# Patient Record
Sex: FEMALE | Race: BLACK OR AFRICAN AMERICAN | NOT HISPANIC OR LATINO | URBAN - METROPOLITAN AREA
[De-identification: names, ages, dates, MRNs, and addresses within clinical notes are randomized per-mention and may not be internally consistent; named-entity substitution may affect disease eponyms.]

---

## 2017-01-10 ENCOUNTER — APPOINTMENT (OUTPATIENT)
Dept: WOMENS IMAGING | Facility: HOSPITAL | Age: 67
End: 2017-01-10

## 2017-01-10 PROCEDURE — 76641 ULTRASOUND BREAST COMPLETE: CPT | Performed by: RADIOLOGY

## 2017-01-18 ENCOUNTER — OFFICE (AMBULATORY)
Dept: URBAN - METROPOLITAN AREA CLINIC 75 | Facility: CLINIC | Age: 67
End: 2017-01-18

## 2017-01-18 VITALS
HEART RATE: 70 BPM | SYSTOLIC BLOOD PRESSURE: 122 MMHG | DIASTOLIC BLOOD PRESSURE: 82 MMHG | HEIGHT: 61 IN | WEIGHT: 136 LBS

## 2017-01-18 DIAGNOSIS — K59.00 CONSTIPATION, UNSPECIFIED: ICD-10-CM

## 2017-01-18 DIAGNOSIS — K21.9 GASTRO-ESOPHAGEAL REFLUX DISEASE WITHOUT ESOPHAGITIS: ICD-10-CM

## 2017-01-18 DIAGNOSIS — R14.0 ABDOMINAL DISTENSION (GASEOUS): ICD-10-CM

## 2017-01-18 DIAGNOSIS — R19.4 CHANGE IN BOWEL HABIT: ICD-10-CM

## 2017-01-18 DIAGNOSIS — R10.84 GENERALIZED ABDOMINAL PAIN: ICD-10-CM

## 2017-01-18 PROCEDURE — 99214 OFFICE O/P EST MOD 30 MIN: CPT

## 2017-02-13 VITALS
DIASTOLIC BLOOD PRESSURE: 88 MMHG | RESPIRATION RATE: 18 BRPM | SYSTOLIC BLOOD PRESSURE: 187 MMHG | RESPIRATION RATE: 14 BRPM | HEART RATE: 80 BPM | HEART RATE: 78 BPM | SYSTOLIC BLOOD PRESSURE: 167 MMHG | SYSTOLIC BLOOD PRESSURE: 103 MMHG | OXYGEN SATURATION: 99 % | RESPIRATION RATE: 15 BRPM | DIASTOLIC BLOOD PRESSURE: 77 MMHG | RESPIRATION RATE: 16 BRPM | DIASTOLIC BLOOD PRESSURE: 75 MMHG | SYSTOLIC BLOOD PRESSURE: 124 MMHG | OXYGEN SATURATION: 100 % | HEART RATE: 104 BPM | RESPIRATION RATE: 17 BRPM | DIASTOLIC BLOOD PRESSURE: 72 MMHG | HEART RATE: 82 BPM | DIASTOLIC BLOOD PRESSURE: 93 MMHG | SYSTOLIC BLOOD PRESSURE: 112 MMHG | HEART RATE: 68 BPM | DIASTOLIC BLOOD PRESSURE: 68 MMHG | TEMPERATURE: 97.7 F | HEART RATE: 103 BPM | WEIGHT: 132 LBS | SYSTOLIC BLOOD PRESSURE: 121 MMHG | DIASTOLIC BLOOD PRESSURE: 63 MMHG | SYSTOLIC BLOOD PRESSURE: 109 MMHG | HEART RATE: 91 BPM | SYSTOLIC BLOOD PRESSURE: 100 MMHG | DIASTOLIC BLOOD PRESSURE: 58 MMHG | SYSTOLIC BLOOD PRESSURE: 111 MMHG | SYSTOLIC BLOOD PRESSURE: 107 MMHG | HEART RATE: 84 BPM | RESPIRATION RATE: 22 BRPM | HEART RATE: 90 BPM | HEIGHT: 61 IN | DIASTOLIC BLOOD PRESSURE: 64 MMHG | DIASTOLIC BLOOD PRESSURE: 71 MMHG | TEMPERATURE: 97.9 F | OXYGEN SATURATION: 98 %

## 2017-02-15 ENCOUNTER — AMBULATORY SURGICAL CENTER (AMBULATORY)
Dept: URBAN - METROPOLITAN AREA SURGERY 17 | Facility: SURGERY | Age: 67
End: 2017-02-15

## 2017-02-15 DIAGNOSIS — R19.4 CHANGE IN BOWEL HABIT: ICD-10-CM

## 2017-02-15 DIAGNOSIS — R10.84 GENERALIZED ABDOMINAL PAIN: ICD-10-CM

## 2017-02-15 DIAGNOSIS — K59.00 CONSTIPATION, UNSPECIFIED: ICD-10-CM

## 2017-02-15 DIAGNOSIS — R14.0 ABDOMINAL DISTENSION (GASEOUS): ICD-10-CM

## 2017-02-15 DIAGNOSIS — K57.30 DIVERTICULOSIS OF LARGE INTESTINE WITHOUT PERFORATION OR ABS: ICD-10-CM

## 2017-02-15 PROCEDURE — 45378 DIAGNOSTIC COLONOSCOPY: CPT | Performed by: INTERNAL MEDICINE

## 2017-02-15 RX ORDER — LUBIPROSTONE 8 UG/1
CAPSULE, GELATIN COATED ORAL
Qty: 180 | Refills: 1 | Status: COMPLETED
End: 2018-05-08

## 2017-02-15 RX ADMIN — PROPOFOL 50 MG: 10 INJECTION, EMULSION INTRAVENOUS at 13:34

## 2017-02-15 RX ADMIN — PROPOFOL 50 MG: 10 INJECTION, EMULSION INTRAVENOUS at 13:26

## 2017-02-15 RX ADMIN — PROPOFOL 50 MG: 10 INJECTION, EMULSION INTRAVENOUS at 13:20

## 2017-02-15 RX ADMIN — PROPOFOL 150 MG: 10 INJECTION, EMULSION INTRAVENOUS at 13:16

## 2017-03-07 ENCOUNTER — OFFICE VISIT (OUTPATIENT)
Dept: OBSTETRICS AND GYNECOLOGY | Age: 67
End: 2017-03-07

## 2017-03-07 VITALS
BODY MASS INDEX: 25.41 KG/M2 | WEIGHT: 134.6 LBS | DIASTOLIC BLOOD PRESSURE: 68 MMHG | SYSTOLIC BLOOD PRESSURE: 124 MMHG | HEIGHT: 61 IN

## 2017-03-07 DIAGNOSIS — Z00.00 ANNUAL PHYSICAL EXAM: Primary | ICD-10-CM

## 2017-03-07 DIAGNOSIS — M81.0 OSTEOPOROSIS: ICD-10-CM

## 2017-03-07 DIAGNOSIS — I10 ESSENTIAL HYPERTENSION: ICD-10-CM

## 2017-03-07 PROCEDURE — G0101 CA SCREEN;PELVIC/BREAST EXAM: HCPCS | Performed by: OBSTETRICS & GYNECOLOGY

## 2017-03-07 RX ORDER — ACYCLOVIR 50 MG/G
OINTMENT TOPICAL EVERY 4 HOURS
Qty: 15 G | Refills: 1 | Status: SHIPPED | OUTPATIENT
Start: 2017-03-07 | End: 2017-03-14

## 2017-03-07 RX ORDER — AMLODIPINE BESYLATE 2.5 MG/1
2.5 TABLET ORAL
COMMUNITY
Start: 2017-01-19 | End: 2021-03-29 | Stop reason: SDUPTHER

## 2017-03-07 RX ORDER — ACYCLOVIR 400 MG/1
400 TABLET ORAL DAILY
Qty: 90 TABLET | Refills: 1 | Status: SHIPPED | OUTPATIENT
Start: 2017-03-07 | End: 2020-03-16 | Stop reason: SDUPTHER

## 2017-03-07 NOTE — PROGRESS NOTES
Jenna Anuglo is a 67 y.o. female is being seen today for Annual Exam  Chief Complaint   Patient presents with   • Gynecologic Exam   .    History of Present Illness  Patient presents for annual check.  Overall doing very well exercises well is trying to eat better.  Her bowels avoids Benadryl off-and-on but again.  Attention to what she is eating how she is eating when she is eating things are mostly under control.  We talked at smoothies cleansing probiotics she does take activity every day.  No new family history.  One of her good friends is having a heart bypass today.  She does occasionally get the herpes outbreaks and again we talked about what to do for that and she would like prescriptions called in.  No other complaints  The following portions of the patient's history were reviewed and updated as appropriate: allergies, current medications, past family history, past medical history, past social history, past surgical history and problem list.    PAST MEDICAL HISTORY  History reviewed. No pertinent past medical history.  OB History     No data available        History reviewed. No pertinent past surgical history.  History reviewed. No pertinent family history.  History   Smoking Status   • Former Smoker   Smokeless Tobacco   • Not on file       Current Outpatient Prescriptions:   •  amLODIPine (NORVASC) 2.5 MG tablet, Take 2.5 mg by mouth., Disp: , Rfl:   •  acyclovir (ZOVIRAX) 400 MG tablet, Take 1 tablet by mouth Daily. Take no more than 5 doses a day., Disp: 90 tablet, Rfl: 1  •  acyclovir (ZOVIRAX) 5 % ointment, Apply  topically Every 4 (Four) Hours for 7 days. Apply thin layer to affected area, Disp: 15 g, Rfl: 1    There is no immunization history on file for this patient.    Review of Systems   Constitutional: Negative for chills, fatigue, fever and unexpected weight change.   Respiratory: Negative for shortness of breath and wheezing.    Cardiovascular: Negative for chest pain.    Gastrointestinal: Negative for abdominal distention, abdominal pain, blood in stool, constipation, diarrhea and nausea.   Genitourinary: Negative for difficulty urinating, dyspareunia, dysuria, frequency, hematuria, menstrual problem, pelvic pain, urgency and vaginal discharge.   Skin: Negative for rash.       Objective   Physical Exam   Constitutional: She is oriented to person, place, and time. Vital signs are normal. She appears well-developed and well-nourished.   Neck: No thyromegaly present.   Cardiovascular: Normal rate, regular rhythm and normal heart sounds.    Pulmonary/Chest: Effort normal. Right breast exhibits no inverted nipple, no mass, no nipple discharge, no skin change and no tenderness. Left breast exhibits no inverted nipple, no mass, no nipple discharge, no skin change and no tenderness. Breasts are symmetrical. There is no breast swelling.   Abdominal: Soft.   Genitourinary: Vagina normal and uterus normal. No breast tenderness, discharge or bleeding. Pelvic exam was performed with patient supine. No labial fusion. There is no rash, tenderness, lesion or injury on the right labia. There is no rash, tenderness, lesion or injury on the left labia. Cervix exhibits no motion tenderness, no discharge and no friability. Right adnexum displays no mass, no tenderness and no fullness. Left adnexum displays no mass, no tenderness and no fullness.   Neurological: She is alert and oriented to person, place, and time.   Psychiatric: She has a normal mood and affect.   Vitals reviewed.        Assessment/Plan   Lenore was seen today for gynecologic exam.    Diagnoses and all orders for this visit:    Annual physical exam    Essential hypertension    Osteoporosis    Other orders  -     acyclovir (ZOVIRAX) 400 MG tablet; Take 1 tablet by mouth Daily. Take no more than 5 doses a day.  -     acyclovir (ZOVIRAX) 5 % ointment; Apply  topically Every 4 (Four) Hours for 7 days. Apply thin layer to affected  area    Normal exam today.  Pap smear not done today.  She has appointment for mammogram this month.  Colonoscopy couple weeks ago was negative.  And we'll get a bone density next year.  Come back in a year

## 2017-04-24 ENCOUNTER — OFFICE (AMBULATORY)
Dept: URBAN - METROPOLITAN AREA CLINIC 75 | Facility: CLINIC | Age: 67
End: 2017-04-24

## 2017-04-24 VITALS
SYSTOLIC BLOOD PRESSURE: 138 MMHG | HEART RATE: 66 BPM | DIASTOLIC BLOOD PRESSURE: 80 MMHG | WEIGHT: 133 LBS | HEIGHT: 61 IN

## 2017-04-24 DIAGNOSIS — K57.30 DIVERTICULOSIS OF LARGE INTESTINE WITHOUT PERFORATION OR ABS: ICD-10-CM

## 2017-04-24 DIAGNOSIS — R14.0 ABDOMINAL DISTENSION (GASEOUS): ICD-10-CM

## 2017-04-24 DIAGNOSIS — K59.00 CONSTIPATION, UNSPECIFIED: ICD-10-CM

## 2017-04-24 DIAGNOSIS — K21.9 GASTRO-ESOPHAGEAL REFLUX DISEASE WITHOUT ESOPHAGITIS: ICD-10-CM

## 2017-04-24 PROCEDURE — 99214 OFFICE O/P EST MOD 30 MIN: CPT

## 2017-07-26 ENCOUNTER — APPOINTMENT (OUTPATIENT)
Dept: WOMENS IMAGING | Facility: HOSPITAL | Age: 67
End: 2017-07-26

## 2017-07-26 PROCEDURE — 77063 BREAST TOMOSYNTHESIS BI: CPT | Performed by: RADIOLOGY

## 2017-07-26 PROCEDURE — 76641 ULTRASOUND BREAST COMPLETE: CPT | Performed by: RADIOLOGY

## 2017-07-26 PROCEDURE — G0202 SCR MAMMO BI INCL CAD: HCPCS | Performed by: RADIOLOGY

## 2017-09-05 ENCOUNTER — OFFICE (AMBULATORY)
Dept: URBAN - METROPOLITAN AREA CLINIC 75 | Facility: CLINIC | Age: 67
End: 2017-09-05

## 2017-09-05 VITALS
HEIGHT: 61 IN | WEIGHT: 131 LBS | SYSTOLIC BLOOD PRESSURE: 112 MMHG | DIASTOLIC BLOOD PRESSURE: 62 MMHG | HEART RATE: 93 BPM

## 2017-09-05 DIAGNOSIS — R10.84 GENERALIZED ABDOMINAL PAIN: ICD-10-CM

## 2017-09-05 DIAGNOSIS — R19.4 CHANGE IN BOWEL HABIT: ICD-10-CM

## 2017-09-05 DIAGNOSIS — K59.00 CONSTIPATION, UNSPECIFIED: ICD-10-CM

## 2017-09-05 DIAGNOSIS — K57.30 DIVERTICULOSIS OF LARGE INTESTINE WITHOUT PERFORATION OR ABS: ICD-10-CM

## 2017-09-05 DIAGNOSIS — R14.0 ABDOMINAL DISTENSION (GASEOUS): ICD-10-CM

## 2017-09-05 DIAGNOSIS — K21.9 GASTRO-ESOPHAGEAL REFLUX DISEASE WITHOUT ESOPHAGITIS: ICD-10-CM

## 2017-09-05 PROCEDURE — 99214 OFFICE O/P EST MOD 30 MIN: CPT | Performed by: INTERNAL MEDICINE

## 2017-09-05 RX ORDER — LUBIPROSTONE 24 UG/1
CAPSULE, GELATIN COATED ORAL
Qty: 90 | Refills: 4 | Status: ACTIVE

## 2018-03-08 ENCOUNTER — OFFICE VISIT (OUTPATIENT)
Dept: OBSTETRICS AND GYNECOLOGY | Age: 68
End: 2018-03-08

## 2018-03-08 ENCOUNTER — PROCEDURE VISIT (OUTPATIENT)
Dept: OBSTETRICS AND GYNECOLOGY | Age: 68
End: 2018-03-08

## 2018-03-08 VITALS — BODY MASS INDEX: 24.93 KG/M2 | DIASTOLIC BLOOD PRESSURE: 78 MMHG | SYSTOLIC BLOOD PRESSURE: 136 MMHG | WEIGHT: 133 LBS

## 2018-03-08 DIAGNOSIS — K58.1 IRRITABLE BOWEL SYNDROME WITH CONSTIPATION: ICD-10-CM

## 2018-03-08 DIAGNOSIS — Z12.39 SCREENING BREAST EXAMINATION: Primary | ICD-10-CM

## 2018-03-08 DIAGNOSIS — N32.81 OAB (OVERACTIVE BLADDER): ICD-10-CM

## 2018-03-08 DIAGNOSIS — Z00.00 ANNUAL PHYSICAL EXAM: ICD-10-CM

## 2018-03-08 DIAGNOSIS — Z78.0 POST-MENOPAUSAL: Primary | ICD-10-CM

## 2018-03-08 PROCEDURE — 99397 PER PM REEVAL EST PAT 65+ YR: CPT | Performed by: OBSTETRICS & GYNECOLOGY

## 2018-03-08 PROCEDURE — 77080 DXA BONE DENSITY AXIAL: CPT | Performed by: OBSTETRICS & GYNECOLOGY

## 2018-03-08 RX ORDER — LUBIPROSTONE 24 UG/1
24 CAPSULE ORAL 2 TIMES DAILY WITH MEALS
COMMUNITY

## 2018-03-08 RX ORDER — ACYCLOVIR 50 MG/G
OINTMENT TOPICAL
Qty: 15 G | Refills: 1 | Status: SHIPPED | OUTPATIENT
Start: 2018-03-08 | End: 2019-03-08

## 2018-03-08 RX ORDER — POLYETHYLENE GLYCOL 3350 17 G/17G
17 POWDER, FOR SOLUTION ORAL DAILY
COMMUNITY

## 2018-03-08 NOTE — PROGRESS NOTES
Subjective   Lenore Angulo is a 68 y.o. female is being seen today for   Chief Complaint   Patient presents with   • Annual Exam     urine urgency   .    History of Present Illness  Patient is here for an annual check.  Overall she doing well with nothing new in the family.  But she does have some bowel and bladder problems.  She has OAB very minimal PIYUSH is all discussed with the patient.  We also discussed emptying and double voiding.  She's had chronic IBS of the slow type so she's been working on that with different things and is mostly under control beside every so often has low problem getting her stool come out.  I talked about possible cystocele rectocele.  Otherwise her family is doing well still gets outbreaks occasionally had possibly the flu last month but overall is doing well and she is good with exercise calcium etc.  The following portions of the patient's history were reviewed and updated as appropriate: allergies, current medications, past family history, past medical history, past social history, past surgical history and problem list.    Vitals:    03/08/18 0945   BP: 136/78       PAST MEDICAL HISTORY  No past medical history on file.  OB History     No data available        No past surgical history on file.  No family history on file.  History   Smoking Status   • Former Smoker   Smokeless Tobacco   • Not on file       Current Outpatient Prescriptions:   •  acyclovir (ZOVIRAX) 400 MG tablet, Take 1 tablet by mouth Daily. Take no more than 5 doses a day., Disp: 90 tablet, Rfl: 1  •  amLODIPine (NORVASC) 2.5 MG tablet, Take 2.5 mg by mouth., Disp: , Rfl:   •  lubiprostone (AMITIZA) 24 MCG capsule, Take 24 mcg by mouth 2 (Two) Times a Day With Meals., Disp: , Rfl:   •  polyethylene glycol (MIRALAX) packet, Take 17 g by mouth Daily., Disp: , Rfl:   •  acyclovir (ZOVIRAX) 5 % ointment, Apply thin layer to affected area, Disp: 15 g, Rfl: 1    There is no immunization history on file for this  patient.    Review of Systems   Constitutional: Negative for chills, fatigue, fever and unexpected weight change.   Respiratory: Negative for shortness of breath and wheezing.    Cardiovascular: Negative for chest pain.   Gastrointestinal: Negative for abdominal distention, abdominal pain, blood in stool, constipation, diarrhea and nausea.   Genitourinary: Positive for frequency and urgency. Negative for difficulty urinating, dyspareunia, dysuria, hematuria, menstrual problem, pelvic pain and vaginal discharge.   Skin: Negative for rash.       Objective   Physical Exam   Constitutional: She is oriented to person, place, and time. Vital signs are normal. She appears well-developed and well-nourished.   Neck: No thyromegaly present.   Cardiovascular: Normal rate, regular rhythm and normal heart sounds.    Pulmonary/Chest: Effort normal. Right breast exhibits no inverted nipple, no mass, no nipple discharge, no skin change and no tenderness. Left breast exhibits no inverted nipple, no mass, no nipple discharge, no skin change and no tenderness. Breasts are symmetrical. There is no breast swelling.   Abdominal: Soft.   Genitourinary: Vagina normal and uterus normal. No breast tenderness, discharge or bleeding. Pelvic exam was performed with patient supine. No labial fusion. There is no rash, tenderness, lesion or injury on the right labia. There is no rash, tenderness, lesion or injury on the left labia. Cervix exhibits no motion tenderness, no discharge and no friability. Right adnexum displays no mass, no tenderness and no fullness. Left adnexum displays no mass, no tenderness and no fullness.   Neurological: She is alert and oriented to person, place, and time.   Psychiatric: She has a normal mood and affect.   Vitals reviewed.        Assessment/Plan   Lenore was seen today for annual exam.    Diagnoses and all orders for this visit:    Screening breast examination  -     Mammo Screening Digital Tomosynthesis  Bilateral With CAD; Future    Annual physical exam    OAB (overactive bladder)    Irritable bowel syndrome with constipation    Other orders  -     acyclovir (ZOVIRAX) 5 % ointment; Apply thin layer to affected area    On exam is actually very normal with no significant cystocele or rectocele.  Pap smear will be due next year.  Order for mammogram placed.  Colonoscopy up to date.  Bone density was repeated today after 2 years is essentially the same she is right on the line of osteoporosis.  Diet and excise were discussed come back in a year

## 2018-05-08 ENCOUNTER — OFFICE (AMBULATORY)
Dept: URBAN - METROPOLITAN AREA CLINIC 75 | Facility: CLINIC | Age: 68
End: 2018-05-08

## 2018-05-08 VITALS
SYSTOLIC BLOOD PRESSURE: 120 MMHG | HEIGHT: 61 IN | WEIGHT: 132 LBS | DIASTOLIC BLOOD PRESSURE: 74 MMHG | HEART RATE: 64 BPM

## 2018-05-08 DIAGNOSIS — K21.9 GASTRO-ESOPHAGEAL REFLUX DISEASE WITHOUT ESOPHAGITIS: ICD-10-CM

## 2018-05-08 DIAGNOSIS — K59.00 CONSTIPATION, UNSPECIFIED: ICD-10-CM

## 2018-05-08 PROCEDURE — 99213 OFFICE O/P EST LOW 20 MIN: CPT

## 2018-07-30 ENCOUNTER — APPOINTMENT (OUTPATIENT)
Dept: WOMENS IMAGING | Facility: HOSPITAL | Age: 68
End: 2018-07-30

## 2018-07-30 PROCEDURE — 77067 SCR MAMMO BI INCL CAD: CPT | Performed by: RADIOLOGY

## 2018-11-26 ENCOUNTER — OFFICE VISIT (OUTPATIENT)
Dept: OBSTETRICS AND GYNECOLOGY | Age: 68
End: 2018-11-26

## 2018-11-26 ENCOUNTER — PROCEDURE VISIT (OUTPATIENT)
Dept: OBSTETRICS AND GYNECOLOGY | Age: 68
End: 2018-11-26

## 2018-11-26 VITALS
BODY MASS INDEX: 22.88 KG/M2 | DIASTOLIC BLOOD PRESSURE: 82 MMHG | WEIGHT: 134 LBS | SYSTOLIC BLOOD PRESSURE: 142 MMHG | HEIGHT: 64 IN

## 2018-11-26 DIAGNOSIS — R35.0 URINE FREQUENCY: Primary | ICD-10-CM

## 2018-11-26 DIAGNOSIS — N95.0 PMB (POSTMENOPAUSAL BLEEDING): Primary | ICD-10-CM

## 2018-11-26 DIAGNOSIS — N95.0 POSTMENOPAUSAL BLEEDING: ICD-10-CM

## 2018-11-26 DIAGNOSIS — N32.81 OAB (OVERACTIVE BLADDER): ICD-10-CM

## 2018-11-26 PROBLEM — E21.3 HYPERPARATHYROIDISM: Status: ACTIVE | Noted: 2017-12-15

## 2018-11-26 PROBLEM — K63.5 COLON POLYP: Status: ACTIVE | Noted: 2018-11-26

## 2018-11-26 PROBLEM — E55.9 VITAMIN D DEFICIENCY: Status: ACTIVE | Noted: 2017-12-15

## 2018-11-26 LAB
BILIRUB BLD-MCNC: NEGATIVE MG/DL
CLARITY, POC: CLEAR
COLOR UR: ABNORMAL
GLUCOSE UR STRIP-MCNC: NEGATIVE MG/DL
KETONES UR QL: NEGATIVE
LEUKOCYTE EST, POC: NEGATIVE
NITRITE UR-MCNC: NEGATIVE MG/ML
PH UR: 6 [PH] (ref 5–8)
PROT UR STRIP-MCNC: NEGATIVE MG/DL
RBC # UR STRIP: ABNORMAL /UL
SP GR UR: 1.03 (ref 1–1.03)
UROBILINOGEN UR QL: NORMAL

## 2018-11-26 PROCEDURE — 99213 OFFICE O/P EST LOW 20 MIN: CPT | Performed by: OBSTETRICS & GYNECOLOGY

## 2018-11-26 PROCEDURE — 76830 TRANSVAGINAL US NON-OB: CPT | Performed by: OBSTETRICS & GYNECOLOGY

## 2018-11-26 PROCEDURE — 81002 URINALYSIS NONAUTO W/O SCOPE: CPT | Performed by: OBSTETRICS & GYNECOLOGY

## 2018-11-26 NOTE — PROGRESS NOTES
Subjective   Lenore Angulo is a 68 y.o. female is being seen today for   Chief Complaint   Patient presents with   • Follow-up     Lenore is here for urine frequency, uregency, and dark spotting.    • Urinary Frequency   .    History of Present Illness  Patient is here for a problem check.  The other day she was wiping after urinating and nose a little bit of blood.  No associated symptoms no cramps no symptoms of a UTI.  She does have frequency and urgency which is long-standing problem.  She presents for evaluation  The following portions of the patient's history were reviewed and updated as appropriate: allergies, current medications, past family history, past medical history, past social history, past surgical history and problem list.    Vitals:    11/26/18 1009   BP: 142/82         PAST MEDICAL HISTORY  History reviewed. No pertinent past medical history.  OB History     No data available        History reviewed. No pertinent surgical history.  History reviewed. No pertinent family history.  Social History     Tobacco Use   Smoking Status Former Smoker       Current Outpatient Medications:   •  acyclovir (ZOVIRAX) 400 MG tablet, Take 1 tablet by mouth Daily. Take no more than 5 doses a day., Disp: 90 tablet, Rfl: 1  •  amLODIPine (NORVASC) 2.5 MG tablet, Take 2.5 mg by mouth., Disp: , Rfl:   •  lubiprostone (AMITIZA) 24 MCG capsule, Take 24 mcg by mouth 2 (Two) Times a Day With Meals., Disp: , Rfl:   •  polyethylene glycol (MIRALAX) packet, Take 17 g by mouth Daily., Disp: , Rfl:   •  acyclovir (ZOVIRAX) 5 % ointment, Apply thin layer to affected area, Disp: 15 g, Rfl: 1  Immunization History   Administered Date(s) Administered   • Flu Vaccine High Dose PF 65YR+ 10/17/2018   • Flu Vaccine Quad PF >36MO 10/27/2014, 10/02/2017   • Influenza TIV (IM) 10/15/2012, 10/28/2013   • Pneumococcal Conjugate 13-Valent (PCV13) 10/22/2015   • Pneumococcal Polysaccharide (PPSV23) 10/27/2014   • Zostavax 06/26/2013        Review of Systems  Frequency, urgency  Objective   Physical Exam    Exam today was normal and did not see any blood present.  Assessment/Plan   Lenore was seen today for follow-up and urinary frequency.    Diagnoses and all orders for this visit:    Urine frequency  -     POC Urinalysis Dipstick    Postmenopausal bleeding    OAB (overactive bladder)      We did obtain an ultrasound.  Transvaginal ultrasound was performed I was in the room the whole time discussed the results with the patient.  The lining was under 4 mm multiple fibroids 1.  Much up against the canal.  Ovaries looked normal.    Her IBS is actually more under control with Robyn Tis is doing quite well with that.  So I discussed everything today I will have her leave a urine for UA which showed small blood in vault we'll culture that.  I don't believe it's from the uterus.  Results to be communicated  I also gave her some samples of myrbetriq.

## 2018-11-28 LAB
BACTERIA UR CULT: NORMAL
BACTERIA UR CULT: NORMAL

## 2018-12-04 ENCOUNTER — TELEPHONE (OUTPATIENT)
Dept: OBSTETRICS AND GYNECOLOGY | Age: 68
End: 2018-12-04

## 2018-12-04 NOTE — TELEPHONE ENCOUNTER
Lenore was notified no UTI.  She wanted to know what the next step is in regards to her PMB.  She is aware you are out until tomorrow.

## 2018-12-10 ENCOUNTER — TELEPHONE (OUTPATIENT)
Dept: OBSTETRICS AND GYNECOLOGY | Age: 68
End: 2018-12-10

## 2018-12-12 ENCOUNTER — OFFICE VISIT (OUTPATIENT)
Dept: OBSTETRICS AND GYNECOLOGY | Age: 68
End: 2018-12-12

## 2018-12-12 VITALS
DIASTOLIC BLOOD PRESSURE: 74 MMHG | HEIGHT: 62 IN | BODY MASS INDEX: 24.66 KG/M2 | SYSTOLIC BLOOD PRESSURE: 122 MMHG | WEIGHT: 134 LBS

## 2018-12-12 DIAGNOSIS — N95.0 POST-MENOPAUSAL BLEEDING: Primary | ICD-10-CM

## 2018-12-12 PROCEDURE — 58100 BIOPSY OF UTERUS LINING: CPT | Performed by: PHYSICIAN ASSISTANT

## 2018-12-12 RX ORDER — CALCIUM CARBONATE 200(500)MG
2 TABLET,CHEWABLE ORAL DAILY
COMMUNITY

## 2018-12-12 RX ORDER — CHOLECALCIFEROL (VITAMIN D3) 1250 MCG
CAPSULE ORAL
COMMUNITY
Start: 2018-12-08 | End: 2020-07-17 | Stop reason: SDUPTHER

## 2018-12-12 NOTE — PROGRESS NOTES
Endometrial Biopsy    Date of procedure:  12/12/2018    Procedure documentation:    The cervix was grasped anterior at the 12 o'clock position.  The cavity sounded to 6 centimeters.  An endometrial biopsy specimen was obtained and multiple passes.  The tissue was sent for permanent histopathologic evaluation.  Loretta was removed from the cervix with scant bleeding.    Post procedure instructions: She was instructed to call us in 1 week's time if she has not heard from us otherwise.  If there is any significant fever or excessive bleeding or pain she is to call immediately.      Pt tolerated procedure well  Will call with final results

## 2018-12-15 LAB
DX ICD CODE: NORMAL
DX ICD CODE: NORMAL
PATH REPORT.FINAL DX SPEC: NORMAL
PATH REPORT.GROSS SPEC: NORMAL
PATH REPORT.RELEVANT HX SPEC: NORMAL
PATH REPORT.SITE OF ORIGIN SPEC: NORMAL
PATHOLOGIST NAME: NORMAL
PAYMENT PROCEDURE: NORMAL

## 2019-03-13 ENCOUNTER — OFFICE VISIT (OUTPATIENT)
Dept: OBSTETRICS AND GYNECOLOGY | Age: 69
End: 2019-03-13

## 2019-03-13 VITALS
WEIGHT: 132 LBS | BODY MASS INDEX: 24.29 KG/M2 | HEIGHT: 62 IN | DIASTOLIC BLOOD PRESSURE: 74 MMHG | SYSTOLIC BLOOD PRESSURE: 140 MMHG

## 2019-03-13 DIAGNOSIS — K21.9 GASTROESOPHAGEAL REFLUX DISEASE, ESOPHAGITIS PRESENCE NOT SPECIFIED: Primary | ICD-10-CM

## 2019-03-13 DIAGNOSIS — Z12.4 PAP SMEAR FOR CERVICAL CANCER SCREENING: ICD-10-CM

## 2019-03-13 DIAGNOSIS — Z11.51 SCREENING FOR HUMAN PAPILLOMAVIRUS: ICD-10-CM

## 2019-03-13 DIAGNOSIS — Z00.00 ANNUAL PHYSICAL EXAM: ICD-10-CM

## 2019-03-13 DIAGNOSIS — D21.9 FIBROIDS: ICD-10-CM

## 2019-03-13 PROCEDURE — 99397 PER PM REEVAL EST PAT 65+ YR: CPT | Performed by: OBSTETRICS & GYNECOLOGY

## 2019-03-13 RX ORDER — OMEPRAZOLE 40 MG/1
CAPSULE, DELAYED RELEASE ORAL
COMMUNITY
Start: 2019-03-10

## 2019-03-13 NOTE — PROGRESS NOTES
Subjective   Lenore Angulo is a 69 y.o. female is being seen today for   Chief Complaint   Patient presents with   • Follow-up      PMB bleeding in Dec. 2018, bx = normal.  2016 pap = negative.  Dexa 03/08/2018  MG 07/30/2018.  PM no HRT. C-scope is due (hx of colon polyps). Family hx of breast cancer.  Lenore is BrCa negative.    .    History of Present Illness  Patient is here for her annual check.  She was in few months ago for some spotting had ultrasound biopsy everything was okay with that.  No further bleeding.  Had a trip recently through the [place] and is set to go to [place] for spring break.  She has a little problem with her left knee has been aching lately and swelling up some she is going to have that looked at.  Otherwise everything is been much the same.  Still has OAB and IBS.  Nothing new in the family no other complaints  The following portions of the patient's history were reviewed and updated as appropriate: allergies, current medications, past family history, past medical history, past social history, past surgical history and problem list.    Vitals:    03/13/19 0844   BP: 140/74       PAST MEDICAL HISTORY  Past Medical History:   Diagnosis Date   • BRCA negative    • Colon polyps      OB History     No data available        History reviewed. No pertinent surgical history.  Family History   Problem Relation Age of Onset   • Breast cancer Mother    • Breast cancer Sister    • No Known Problems Son    • No Known Problems Son      Social History     Tobacco Use   Smoking Status Never Smoker   Smokeless Tobacco Never Used       Current Outpatient Medications:   •  acyclovir (ZOVIRAX) 400 MG tablet, Take 1 tablet by mouth Daily. Take no more than 5 doses a day., Disp: 90 tablet, Rfl: 1  •  amLODIPine (NORVASC) 2.5 MG tablet, Take 2.5 mg by mouth., Disp: , Rfl:   •  Cholecalciferol (VITAMIN D3) 73526 units capsule, TAKE 1 TABLET BY MOUTH ONCE A WEEK, Disp: , Rfl:   •  lubiprostone (AMITIZA) 24  MCG capsule, Take 24 mcg by mouth 2 (Two) Times a Day With Meals., Disp: , Rfl:   •  omeprazole (priLOSEC) 40 MG capsule, , Disp: , Rfl:   •  polyethylene glycol (MIRALAX) packet, Take 17 g by mouth Daily., Disp: , Rfl:   •  calcium carbonate (TUMS) 500 MG chewable tablet, Chew 2 tablets Daily., Disp: , Rfl:   Immunization History   Administered Date(s) Administered   • Flu Vaccine High Dose PF 65YR+ 10/17/2018   • Flu Vaccine Quad PF >36MO 10/27/2014, 10/02/2017   • Influenza TIV (IM) 10/15/2012, 10/28/2013   • Pneumococcal Conjugate 13-Valent (PCV13) 10/22/2015   • Pneumococcal Polysaccharide (PPSV23) 10/27/2014   • Zostavax 06/26/2013       Review of Systems   Constitutional: Negative for chills, fatigue, fever and unexpected weight change.   Respiratory: Negative for shortness of breath and wheezing.    Cardiovascular: Negative for chest pain.   Gastrointestinal: Positive for constipation. Negative for abdominal distention, abdominal pain, blood in stool, diarrhea and nausea.   Genitourinary: Positive for frequency and urgency. Negative for difficulty urinating, dyspareunia, dysuria, hematuria, menstrual problem, pelvic pain and vaginal discharge.   Musculoskeletal: Positive for arthralgias.   Skin: Negative for rash.       Objective   Physical Exam   Constitutional: She is oriented to person, place, and time. Vital signs are normal. She appears well-developed and well-nourished.   Neck: No thyromegaly present.   Cardiovascular: Normal rate, regular rhythm and normal heart sounds.   Pulmonary/Chest: Effort normal. Right breast exhibits no inverted nipple, no mass, no nipple discharge, no skin change and no tenderness. Left breast exhibits no inverted nipple, no mass, no nipple discharge, no skin change and no tenderness. Breasts are symmetrical. There is no breast swelling.   Abdominal: Soft.   Genitourinary: Vagina normal and uterus normal. No breast tenderness, discharge or bleeding. Pelvic exam was performed  with patient supine. No labial fusion. There is no rash, tenderness, lesion or injury on the right labia. There is no rash, tenderness, lesion or injury on the left labia. Cervix exhibits no motion tenderness, no discharge and no friability. Right adnexum displays no mass, no tenderness and no fullness. Left adnexum displays no mass, no tenderness and no fullness.   Neurological: She is alert and oriented to person, place, and time.   Psychiatric: She has a normal mood and affect.   Vitals reviewed.        Assessment/Plan   Lenore was seen today for follow-up.    Diagnoses and all orders for this visit:    Gastroesophageal reflux disease, esophagitis presence not specified    Annual physical exam    Fibroids      Overall the exam is normal.  Years does have some fibroids.  Pap smear done today.  Mammogram bone colonoscopies all up-to-date she is about to have another colonoscopy.  She has had polyps in the past.  She is good with exercise.  She had recent bout of left-sided facial discomfort when she exercised she has TMJ and she is going to have that looked at.  Back in a year

## 2019-03-15 LAB
CONV .: NORMAL
CYTOLOGIST CVX/VAG CYTO: NORMAL
CYTOLOGY CVX/VAG DOC THIN PREP: NORMAL
DX ICD CODE: NORMAL
HIV 1 & 2 AB SER-IMP: NORMAL
OTHER STN SPEC: NORMAL
PATH REPORT.FINAL DX SPEC: NORMAL
STAT OF ADQ CVX/VAG CYTO-IMP: NORMAL

## 2019-05-28 ENCOUNTER — OFFICE (AMBULATORY)
Dept: URBAN - METROPOLITAN AREA CLINIC 75 | Facility: CLINIC | Age: 69
End: 2019-05-28

## 2019-05-28 VITALS
HEART RATE: 80 BPM | DIASTOLIC BLOOD PRESSURE: 70 MMHG | SYSTOLIC BLOOD PRESSURE: 130 MMHG | HEIGHT: 61 IN | WEIGHT: 133 LBS

## 2019-05-28 DIAGNOSIS — R10.84 GENERALIZED ABDOMINAL PAIN: ICD-10-CM

## 2019-05-28 DIAGNOSIS — R14.0 ABDOMINAL DISTENSION (GASEOUS): ICD-10-CM

## 2019-05-28 DIAGNOSIS — R19.4 CHANGE IN BOWEL HABIT: ICD-10-CM

## 2019-05-28 DIAGNOSIS — K59.00 CONSTIPATION, UNSPECIFIED: ICD-10-CM

## 2019-05-28 DIAGNOSIS — K57.30 DIVERTICULOSIS OF LARGE INTESTINE WITHOUT PERFORATION OR ABS: ICD-10-CM

## 2019-05-28 PROCEDURE — 99213 OFFICE O/P EST LOW 20 MIN: CPT | Performed by: INTERNAL MEDICINE

## 2019-05-28 RX ORDER — LUBIPROSTONE 24 UG/1
CAPSULE, GELATIN COATED ORAL
Qty: 90 | Refills: 4 | Status: ACTIVE

## 2019-05-28 RX ORDER — LUBIPROSTONE 24 UG/1
48 CAPSULE, GELATIN COATED ORAL
Qty: 180 | Refills: 3 | Status: COMPLETED
Start: 2019-05-28 | End: 2021-03-12

## 2019-07-31 ENCOUNTER — APPOINTMENT (OUTPATIENT)
Dept: WOMENS IMAGING | Facility: HOSPITAL | Age: 69
End: 2019-07-31

## 2019-07-31 PROCEDURE — 77067 SCR MAMMO BI INCL CAD: CPT | Performed by: RADIOLOGY

## 2019-07-31 PROCEDURE — 77063 BREAST TOMOSYNTHESIS BI: CPT | Performed by: RADIOLOGY

## 2020-03-16 ENCOUNTER — OFFICE VISIT (OUTPATIENT)
Dept: OBSTETRICS AND GYNECOLOGY | Age: 70
End: 2020-03-16

## 2020-03-16 VITALS
DIASTOLIC BLOOD PRESSURE: 70 MMHG | SYSTOLIC BLOOD PRESSURE: 122 MMHG | WEIGHT: 134 LBS | BODY MASS INDEX: 24.66 KG/M2 | HEIGHT: 62 IN

## 2020-03-16 DIAGNOSIS — N32.81 OAB (OVERACTIVE BLADDER): ICD-10-CM

## 2020-03-16 DIAGNOSIS — M81.0 AGE-RELATED OSTEOPOROSIS WITHOUT CURRENT PATHOLOGICAL FRACTURE: ICD-10-CM

## 2020-03-16 DIAGNOSIS — Z00.00 ANNUAL PHYSICAL EXAM: Primary | ICD-10-CM

## 2020-03-16 PROCEDURE — 99397 PER PM REEVAL EST PAT 65+ YR: CPT | Performed by: OBSTETRICS & GYNECOLOGY

## 2020-03-16 RX ORDER — ACYCLOVIR 400 MG/1
400 TABLET ORAL DAILY
Qty: 90 TABLET | Refills: 2 | Status: SHIPPED | OUTPATIENT
Start: 2020-03-16 | End: 2021-03-29 | Stop reason: SDUPTHER

## 2020-03-16 RX ORDER — FLUTICASONE PROPIONATE 50 MCG
SPRAY, SUSPENSION (ML) NASAL
COMMUNITY
Start: 2019-12-23

## 2020-03-16 RX ORDER — ACYCLOVIR 50 MG/G
OINTMENT TOPICAL
Qty: 15 G | Refills: 1 | Status: SHIPPED | OUTPATIENT
Start: 2020-03-16 | End: 2021-03-16

## 2020-03-16 NOTE — PROGRESS NOTES
Jenna Angulo is a 70 y.o. female is being seen today for   Chief Complaint   Patient presents with   • Gynecologic Exam     Pap 2019, MG 2019, DEXA 2018, unknown    .    History of Present Illness  Patient is here for an annual exam.  Overall she doing well had a great trip to [place] and [place].  Nothing new the family both kids live in town and grandkids.  Nothing new to report.  Bowels are the same but very much in control with Amitiza MiraLAX about once a month and a lot of water.  And her bladder is the same with urgency and urge incontinence but she is okay with that at this point.  I did offer referral to urology if it gets out of control.  She said if she couple more outbreaks this year of the herpes but she has been under little bit more stress lately.  I talked about taking them every day instead of just when she gets the outbreak give her some suppression for a while she would like the cream and the pill called in.  No other complaints  The following portions of the patient's history were reviewed and updated as appropriate: allergies, current medications, past family history, past medical history, past social history, past surgical history and problem list.    Vitals:    20 1023   BP: 122/70       PAST MEDICAL HISTORY  Past Medical History:   Diagnosis Date   • BRCA negative    • Colon polyps      OB History        2    Para   2    Term   2            AB        Living   2       SAB        TAB        Ectopic        Molar        Multiple        Live Births   2              History reviewed. No pertinent surgical history.  Family History   Problem Relation Age of Onset   • Breast cancer Mother    • Breast cancer Sister    • No Known Problems Son    • No Known Problems Son      Social History     Tobacco Use   Smoking Status Never Smoker   Smokeless Tobacco Never Used       Current Outpatient Medications:   •  acyclovir (ZOVIRAX) 400 MG tablet, Take 1 tablet by mouth  Daily. Take no more than 5 doses a day., Disp: 90 tablet, Rfl: 2  •  amLODIPine (NORVASC) 2.5 MG tablet, Take 2.5 mg by mouth., Disp: , Rfl:   •  calcium carbonate (TUMS) 500 MG chewable tablet, Chew 2 tablets Daily., Disp: , Rfl:   •  Cholecalciferol (VITAMIN D3) 80897 units capsule, TAKE 1 TABLET BY MOUTH ONCE A WEEK, Disp: , Rfl:   •  fluticasone (FLONASE) 50 MCG/ACT nasal spray, , Disp: , Rfl:   •  lubiprostone (AMITIZA) 24 MCG capsule, Take 24 mcg by mouth 2 (Two) Times a Day With Meals., Disp: , Rfl:   •  omeprazole (priLOSEC) 40 MG capsule, , Disp: , Rfl:   •  polyethylene glycol (MIRALAX) packet, Take 17 g by mouth Daily., Disp: , Rfl:   •  acyclovir (ZOVIRAX) 5 % ointment, Apply thin layer to affected area, Disp: 15 g, Rfl: 1  Immunization History   Administered Date(s) Administered   • Flu Vaccine Quad PF >36MO 10/27/2014, 10/02/2017   • Fluzone High Dose =>65 Years ([employer] ONLY) 10/17/2018   • Influenza TIV (IM) 10/15/2012, 10/28/2013   • Pneumococcal Conjugate 13-Valent (PCV13) 10/22/2015   • Pneumococcal Polysaccharide (PPSV23) 10/27/2014   • Zostavax 06/26/2013       Review of Systems   Constitutional: Negative for chills, fatigue, fever and unexpected weight change.   Respiratory: Negative for shortness of breath and wheezing.    Cardiovascular: Negative for chest pain.   Gastrointestinal: Positive for constipation. Negative for abdominal distention, abdominal pain, blood in stool, diarrhea and nausea.   Genitourinary: Positive for urgency. Negative for difficulty urinating, dyspareunia, dysuria, frequency, hematuria, menstrual problem, pelvic pain and vaginal discharge.   Musculoskeletal: Positive for arthralgias.   Skin: Negative for rash.       Objective   Physical Exam   Constitutional: She is oriented to person, place, and time. Vital signs are normal. She appears well-developed and well-nourished.   Neck: No thyromegaly present.   Cardiovascular: Normal rate, regular rhythm and normal heart  sounds.   Pulmonary/Chest: Effort normal. Right breast exhibits no inverted nipple, no mass, no nipple discharge, no skin change and no tenderness. Left breast exhibits no inverted nipple, no mass, no nipple discharge, no skin change and no tenderness. No breast swelling, tenderness, discharge or bleeding. Breasts are symmetrical.   Abdominal: Soft.   Genitourinary: Vagina normal and uterus normal. No breast swelling, tenderness, discharge or bleeding. Pelvic exam was performed with patient supine. No labial fusion. There is no rash, tenderness, lesion or injury on the right labia. There is no rash, tenderness, lesion or injury on the left labia. Cervix exhibits no motion tenderness, no discharge and no friability. Right adnexum displays no mass, no tenderness and no fullness. Left adnexum displays no mass, no tenderness and no fullness.   Neurological: She is alert and oriented to person, place, and time.   Psychiatric: She has a normal mood and affect.   Vitals reviewed.        Assessment/Plan   Lenore was seen today for gynecologic exam.    Diagnoses and all orders for this visit:    Annual physical exam    OAB (overactive bladder)    Age-related osteoporosis without current pathological fracture    Other orders  -     acyclovir (ZOVIRAX) 400 MG tablet; Take 1 tablet by mouth Daily. Take no more than 5 doses a day.  -     acyclovir (ZOVIRAX) 5 % ointment; Apply thin layer to affected area        Overall normal exam today.  Pap smear done last year so not done today.  Mammograms in July of each year and up-to-date.  Bone density is been 2 years she needs a repeat she has early osteoporosis.  And colonoscopy is most likely due this year she knows to call him.  She stays very active she walks a lot.  Has some arthritis knee hip.  Back in a year

## 2020-07-01 ENCOUNTER — PROCEDURE VISIT (OUTPATIENT)
Dept: OBSTETRICS AND GYNECOLOGY | Age: 70
End: 2020-07-01

## 2020-07-01 DIAGNOSIS — Z78.0 POST-MENOPAUSAL: Primary | ICD-10-CM

## 2020-07-01 PROCEDURE — 77080 DXA BONE DENSITY AXIAL: CPT | Performed by: OBSTETRICS & GYNECOLOGY

## 2020-07-17 ENCOUNTER — OFFICE VISIT (OUTPATIENT)
Dept: OBSTETRICS AND GYNECOLOGY | Age: 70
End: 2020-07-17

## 2020-07-17 VITALS
WEIGHT: 133 LBS | SYSTOLIC BLOOD PRESSURE: 124 MMHG | BODY MASS INDEX: 24.48 KG/M2 | HEIGHT: 62 IN | DIASTOLIC BLOOD PRESSURE: 70 MMHG

## 2020-07-17 DIAGNOSIS — M81.0 OSTEOPOROSIS WITHOUT CURRENT PATHOLOGICAL FRACTURE, UNSPECIFIED OSTEOPOROSIS TYPE: Primary | ICD-10-CM

## 2020-07-17 PROCEDURE — 99212 OFFICE O/P EST SF 10 MIN: CPT | Performed by: OBSTETRICS & GYNECOLOGY

## 2020-07-17 RX ORDER — CHOLECALCIFEROL (VITAMIN D3) 1250 MCG
50000 CAPSULE ORAL WEEKLY
Qty: 12 CAPSULE | Refills: 3 | Status: SHIPPED | OUTPATIENT
Start: 2020-07-17 | End: 2021-06-07

## 2020-07-17 NOTE — PROGRESS NOTES
SUBJECTIVE:     Lenore Angulo is a 70 y.o. female who presents in consultation for osteoporosis.  Patient has previously seen Dr. Moeller and since he is retired her DEXA scan results came to me.  I wanted to see her in person to review and discuss.    OSTEOPOROSIS risk factors (non-modifiable)  Personal Hx of fracture as an adult: no  Hx of fracture in first-degree relative: yes   race: no  Advanced age: yes  female sex: yes  Dementia: no  Poor health/frailty:no     OSTEOPOROSIS risk factors (potentially modifiable):  Tobacco use: no  Low body weight (<127 lbs): no  Estrogen deficiency     early menopause (age <45) or bilateral ovariectomy: no     prolonged premenopausal amenorrhea (>1 yr): no  Low calcium intake (lifelong): no  Alcoholism: no  Recurrent falls: no  Inadequate physical activity: no walks 50 minutes a day     Current calcium and Vit D intake:  Dietary sources: soy milk,yougert   supplements: MVI        OBJECTIVE:    No exam performed today.    Results of prior DXA: Prior bone density showed T score of -2.6 at the spine and -2.5 at the hip.           ASSESSMENT:     Osteoporosis of the spine osteopenia at the hip-patient has a great exercise schedule.  She is taking a vitamin D but needs to add more calcium.  I discussed that I would recommend treatment.  She reports that she did take Fosamax briefly in the past but her primary care doctor took her off of it.  She would like to discuss her results with her primary care physician to decide on treatment.  We discussed Fosamax can cause esophageal problems and the patient does have reflux.  She states that she would prefer taking an oral medication.  We also discussed Reclast once a year infusion        PLAN:           Labs:  See orders.    Is pharmacological therapy indicated? yes  Patient would like to discuss with primary care.    A. Contraindications to bisphosphonates: hypocalcemia, esophageal abnormalities, inability to remain upright for  at least 30 min. post dose, renal insufficiency. Contraindications reviewed: no.    B. Patients on bisphosphonates should also receive adequate calcium and vitamin D supplementation. Patient advised regarding calcium and vitamin D supplements: yes. Patient advised not to take these supplements at same time as bisphosphonate due to inhibition of absorption: yes.    C. Contraindications to SERM therapy: history of blood clots, may increase hot flashes and cause leg cramping.

## 2020-08-03 ENCOUNTER — APPOINTMENT (OUTPATIENT)
Dept: WOMENS IMAGING | Facility: HOSPITAL | Age: 70
End: 2020-08-03

## 2020-08-03 PROCEDURE — 77067 SCR MAMMO BI INCL CAD: CPT | Performed by: RADIOLOGY

## 2020-08-03 PROCEDURE — 77063 BREAST TOMOSYNTHESIS BI: CPT | Performed by: RADIOLOGY

## 2020-11-18 ENCOUNTER — TELEPHONE (OUTPATIENT)
Dept: OBSTETRICS AND GYNECOLOGY | Age: 70
End: 2020-11-18

## 2020-11-18 RX ORDER — FLUCONAZOLE 150 MG/1
150 TABLET ORAL ONCE
Qty: 2 TABLET | Refills: 0 | Status: SHIPPED | OUTPATIENT
Start: 2020-11-18 | End: 2020-11-18

## 2020-11-18 NOTE — TELEPHONE ENCOUNTER
(Fmr [facility] pt) - Pt called, having a discharge & itchiness.  Can a prescription be called in for diflucan?    Pharmacy verified.    [phone]

## 2020-12-03 ENCOUNTER — TELEPHONE (OUTPATIENT)
Dept: OBSTETRICS AND GYNECOLOGY | Age: 70
End: 2020-12-03

## 2020-12-03 DIAGNOSIS — N39.46 MIXED INCONTINENCE: Primary | ICD-10-CM

## 2020-12-03 NOTE — TELEPHONE ENCOUNTER
Pt is having incontinence issues and would like to know if we would refer out to a urologist or be seen in office.    [phone]

## 2020-12-16 RX ORDER — FLUCONAZOLE 150 MG/1
150 TABLET ORAL DAILY
Qty: 1 TABLET | Refills: 0 | Status: SHIPPED | OUTPATIENT
Start: 2020-12-16 | End: 2021-03-29

## 2020-12-16 NOTE — TELEPHONE ENCOUNTER
(Former [facility]io Pt)       Pt called stating that her sx's improved after taking the diflucan. Pt states it has come back and has the itching and discharge. Pt states there is no odor. Pt does have an appt on Monday with a urologist.     Please advise     Pharmacy verified       [phone]

## 2021-01-18 ENCOUNTER — TELEPHONE (OUTPATIENT)
Dept: OBSTETRICS AND GYNECOLOGY | Age: 71
End: 2021-01-18

## 2021-01-19 ENCOUNTER — OFFICE VISIT (OUTPATIENT)
Dept: OBSTETRICS AND GYNECOLOGY | Age: 71
End: 2021-01-19

## 2021-01-19 VITALS
DIASTOLIC BLOOD PRESSURE: 74 MMHG | HEIGHT: 62 IN | WEIGHT: 135 LBS | BODY MASS INDEX: 24.84 KG/M2 | SYSTOLIC BLOOD PRESSURE: 126 MMHG

## 2021-01-19 DIAGNOSIS — R32 URINARY INCONTINENCE, UNSPECIFIED TYPE: ICD-10-CM

## 2021-01-19 DIAGNOSIS — N89.8 VAGINAL IRRITATION: Primary | ICD-10-CM

## 2021-01-19 PROCEDURE — 99213 OFFICE O/P EST LOW 20 MIN: CPT | Performed by: PHYSICIAN ASSISTANT

## 2021-01-19 RX ORDER — CLOTRIMAZOLE AND BETAMETHASONE DIPROPIONATE 10; .64 MG/G; MG/G
CREAM TOPICAL EVERY 12 HOURS SCHEDULED
Qty: 15 G | Refills: 0 | Status: SHIPPED | OUTPATIENT
Start: 2021-01-19

## 2021-01-19 RX ORDER — AZELASTINE HYDROCHLORIDE, FLUTICASONE PROPIONATE 137; 50 UG/1; UG/1
SPRAY, METERED NASAL
COMMUNITY

## 2021-01-19 RX ORDER — GABAPENTIN 300 MG/1
CAPSULE ORAL
COMMUNITY
Start: 2020-10-14 | End: 2021-03-29

## 2021-01-19 NOTE — PROGRESS NOTES
"Subjective     Chief Complaint   Patient presents with   • Gynecologic Exam     c/o vaginal irritation, has taken meds we have sent in and it keeps coming back       Lenore Angulo is a 70 y.o.  whose LMP is No LMP recorded (lmp unknown). Patient is postmenopausal. presents with vaginal irritation and possibly d/c  She has noted it for the past month or so  Unsure if it is bladder related or a vaginal infection  No odor or itching, just feels \"different\"  Recently seen by urology for OAB  Does not believe they checked her for infection  No dysuria but notes frequency and urgency    Pt of Dr Espino  New to me with this concern      No Additional Complaints Reported    The following portions of the patient's history were reviewed and updated as appropriate:vital signs, allergies, current medications, past family history, past medical history, past social history, past surgical history and problem list      Review of Systems   Genitourinary:positive for vaginal discharge     Objective      /74   Ht 156.2 cm (61.5\")   Wt 61.2 kg (135 lb)   LMP  (LMP Unknown)   Breastfeeding No   BMI 25.09 kg/m²     Physical Exam    General:   alert, comfortable and no distress   Heart: Not performed today   Lungs: Not performed today.   Breast: Not performed today   Neck: na   Abdomen: {Not performed today   CVA: Not performed today   Pelvis: External genitalia: normal general appearance  Vaginal: atrophic mucosa  Cervix: normal appearance and nuswab obtained  Adnexa: normal bimanual exam  Uterus: normal single, nontender   Extremities: Not performed today   Neurologic: Not performed today   Psychiatric: Normal affect, judgement, and mood       Lab Review   Labs: No data reviewed     Imaging   No data reviewed    Assessment/Plan     ASSESSMENT  1. Vaginal irritation    2. Urinary incontinence, unspecified type        PLAN  1.   Orders Placed This Encounter   Procedures   • NuSwab BV & Candida - , Cervix       2. " Medications prescribed this encounter:        New Medications Ordered This Visit   Medications   • clotrimazole-betamethasone (Lotrisone) 1-0.05 % cream     Sig: Apply  topically to the appropriate area as directed Every 12 (Twelve) Hours.     Dispense:  15 g     Refill:  0       3. Suspect atrophic vaginitis complicated by OAB and PIYUSH.  Enc topical options to include vaseline/A&D ointment or coconut oil as protective barrier. Also sent in lotrisone to use prn. Will send swab but no obvious s/s of infection. Also disc trying vaginal estrogen. Will call if sx's not improved an wants to try that option. Enc to avoid soap vaginally as well.     Follow up: 2 month(s)    KOBE Engel  1/19/2021

## 2021-01-21 LAB
A VAGINAE DNA VAG QL NAA+PROBE: NORMAL SCORE
BACTERIA UR CULT: NORMAL
BACTERIA UR CULT: NORMAL
BVAB2 DNA VAG QL NAA+PROBE: NORMAL SCORE
C ALBICANS DNA VAG QL NAA+PROBE: NEGATIVE
C GLABRATA DNA VAG QL NAA+PROBE: NEGATIVE
MEGA1 DNA VAG QL NAA+PROBE: NORMAL SCORE

## 2021-03-12 ENCOUNTER — OFFICE (AMBULATORY)
Dept: URBAN - METROPOLITAN AREA CLINIC 75 | Facility: CLINIC | Age: 71
End: 2021-03-12

## 2021-03-12 VITALS
WEIGHT: 136 LBS | DIASTOLIC BLOOD PRESSURE: 90 MMHG | HEIGHT: 61 IN | TEMPERATURE: 97.7 F | SYSTOLIC BLOOD PRESSURE: 150 MMHG

## 2021-03-12 DIAGNOSIS — K21.9 GASTRO-ESOPHAGEAL REFLUX DISEASE WITHOUT ESOPHAGITIS: ICD-10-CM

## 2021-03-12 DIAGNOSIS — K59.00 CONSTIPATION, UNSPECIFIED: ICD-10-CM

## 2021-03-12 DIAGNOSIS — L29.0 PRURITUS ANI: ICD-10-CM

## 2021-03-12 PROCEDURE — 99214 OFFICE O/P EST MOD 30 MIN: CPT | Performed by: NURSE PRACTITIONER

## 2021-03-12 RX ORDER — LUBIPROSTONE 24 UG/1
48 CAPSULE, GELATIN COATED ORAL
Qty: 180 | Refills: 3 | Status: COMPLETED
Start: 2019-05-28 | End: 2021-03-12

## 2021-03-12 RX ORDER — LUBIPROSTONE 24 UG/1
24 CAPSULE, GELATIN COATED ORAL
Qty: 90 | Refills: 3 | Status: COMPLETED
Start: 2021-03-12 | End: 2022-05-02

## 2021-03-29 ENCOUNTER — OFFICE VISIT (OUTPATIENT)
Dept: OBSTETRICS AND GYNECOLOGY | Age: 71
End: 2021-03-29

## 2021-03-29 VITALS
WEIGHT: 134 LBS | SYSTOLIC BLOOD PRESSURE: 124 MMHG | BODY MASS INDEX: 24.66 KG/M2 | HEIGHT: 62 IN | DIASTOLIC BLOOD PRESSURE: 80 MMHG

## 2021-03-29 DIAGNOSIS — Z01.419 ENCOUNTER FOR GYNECOLOGICAL EXAMINATION WITHOUT ABNORMAL FINDING: Primary | ICD-10-CM

## 2021-03-29 DIAGNOSIS — Z12.4 SCREENING FOR MALIGNANT NEOPLASM OF THE CERVIX: ICD-10-CM

## 2021-03-29 DIAGNOSIS — Z11.51 SPECIAL SCREENING EXAMINATION FOR HUMAN PAPILLOMAVIRUS (HPV): ICD-10-CM

## 2021-03-29 PROCEDURE — 99397 PER PM REEVAL EST PAT 65+ YR: CPT | Performed by: OBSTETRICS & GYNECOLOGY

## 2021-03-29 RX ORDER — ACYCLOVIR 400 MG/1
400 TABLET ORAL DAILY
Qty: 90 TABLET | Refills: 3 | Status: SHIPPED | OUTPATIENT
Start: 2021-03-29 | End: 2022-05-02

## 2021-03-29 RX ORDER — MIRABEGRON 25 MG/1
TABLET, FILM COATED, EXTENDED RELEASE ORAL
COMMUNITY
Start: 2021-02-25

## 2021-03-29 RX ORDER — AMLODIPINE BESYLATE 5 MG/1
TABLET ORAL
COMMUNITY
Start: 2021-03-24

## 2021-03-29 NOTE — PROGRESS NOTES
Subjective     Chief Complaint   Patient presents with   • Gynecologic Exam     AE today. Former [facility] pt. Was seen by you in 2020 for dexa results.  Referral to urology by Dr. Srinivas KENNEDY 2021. Last pap 19 = neg.  Not sure of colonoscopy date.  She went to [facility] for injection / osteoporosis. Needs refill of acyclovir, takes daily.    • Osteoporosis     yearly injections        History of Present Illness    Lenore Angulo is a 71 y.o.  who presents for annual exam.  Patient previously saw Dr. Moeller.  I did meet with the patient last year her about her osteoporosis of the spine.  Patient is currently on Reclast yearly.  She did well with it.  She felt bad for about 1 day after.  Patient takes acyclovir daily.  Patient is very active with walking and walks about 2 to 4 miles a day.  She reports that she hopes to live a very long life.  She reports she has several relatives have lived over age 100.    No pelvic pain or vaginal bleeding.  Overactive bladder is much better with the Myrbetriq.    Patient's last Pap smear was 2 years ago.  Cells were normal but HPV testing was not able to be done.  Obstetric History:  OB History        2    Para   2    Term   2            AB        Living   2       SAB        TAB        Ectopic        Molar        Multiple        Live Births   2               Menstrual History:     No LMP recorded (lmp unknown). Patient is postmenopausal.         Current contraception: post menopausal status  History of abnormal Pap smear: no  Received Gardasil immunization: no  Perform regular self breast exam : yes  Family history of uterine or ovarian cancer: no  Family History of colon cancer: no  Family history of breast cancer: yes - Mom- 70, sister 70     Mammogram: up to date.  Colonoscopy: up to date.  About 5 yrs --pt to call to check   DEXA: up to date.  osteopenia  spine - Reclast 10/2020     Exercise: 2-4 miles of walking daily   Calcium/Vitamin D:  "adequate intake and uses supplements    The following portions of the patient's history were reviewed and updated as appropriate: allergies, current medications, past family history, past medical history, past social history, past surgical history and problem list.    Review of Systems    Review of Systems   Constitutional: Negative for fatigue.   Respiratory: Negative for shortness of breath.    Gastrointestinal: Negative for abdominal pain.   Genitourinary: Negative for dysuria.   Neurological: Negative for headaches.   Psychiatric/Behavioral: Negative for dysphoric mood.     Objective   Physical Exam    /80   Ht 156.2 cm (61.5\")   Wt 60.8 kg (134 lb)   LMP  (LMP Unknown)   Breastfeeding No   BMI 24.91 kg/m²     General:   alert, appears stated age and cooperative   Neck: thyroid normal to palpation   Heart: regular rate and rhythm   Lungs: clear to auscultation bilaterally   Abdomen: soft, non-tender, without masses or organomegaly   Breast: inspection negative, no nipple discharge or bleeding, no masses or nodularity palpable   Vulva: normal, Bartholin's, Urethra, Winigan's normal   Vagina: normal mucosa, normal discharge   Cervix: no cervical motion tenderness and no lesions   Uterus: non-tender, normal shape and consistency   Adnexa: no mass, fullness, tenderness   Rectal: normal rectal, no masses     Assessment/Plan   Diagnoses and all orders for this visit:    1. Encounter for gynecological examination without abnormal finding (Primary)  -     IGP, Aptima HPV, Rfx 16 / 18,45    2. Screening for malignant neoplasm of the cervix  -     IGP, Aptima HPV, Rfx 16 / 18,45    3. Special screening examination for human papillomavirus (HPV)  -     IGP, Aptima HPV, Rfx 16 / 18,45    Other orders  -     acyclovir (ZOVIRAX) 400 MG tablet; Take 1 tablet by mouth Daily. Take no more than 5 doses a day.  Dispense: 90 tablet; Refill: 3    Patient has a history of colon polyps.  She states that her last colonoscopy " in 2012 there were no polyps and she was told to return in 10 years.  Discussed with history of polyps she could return sooner.  She will call her gastroenterologist    There is only one Pap in our system and HPV testing was not done.  Patient desires continued screening.  Cotest sent off today.    All questions answered.  Breast self exam technique reviewed and patient encouraged to perform self-exam monthly.  Discussed healthy lifestyle modifications.  Recommended 30 minutes of aerobic exercise five times per week.  Discussed calcium needs to prevent osteoporosis.

## 2021-03-31 LAB
CYTOLOGIST CVX/VAG CYTO: NORMAL
CYTOLOGY CVX/VAG DOC CYTO: NORMAL
CYTOLOGY CVX/VAG DOC THIN PREP: NORMAL
DX ICD CODE: NORMAL
HIV 1 & 2 AB SER-IMP: NORMAL
HPV I/H RISK 4 DNA CVX QL PROBE+SIG AMP: NEGATIVE
OTHER STN SPEC: NORMAL
STAT OF ADQ CVX/VAG CYTO-IMP: NORMAL

## 2021-04-01 ENCOUNTER — TELEPHONE (OUTPATIENT)
Dept: OBSTETRICS AND GYNECOLOGY | Age: 71
End: 2021-04-01

## 2021-04-01 NOTE — TELEPHONE ENCOUNTER
Pt notified pap normal. Pt states that she remembered when her last Colonoscopy was, it was 2017 and it was normal.

## 2021-04-01 NOTE — TELEPHONE ENCOUNTER
----- Message from Damien Espino MD sent at 4/1/2021  7:54 AM EDT -----  Please notify pap is normal.

## 2021-06-07 RX ORDER — METHOCARBAMOL 750 MG/1
TABLET ORAL
Qty: 12 CAPSULE | Refills: 3 | Status: SHIPPED | OUTPATIENT
Start: 2021-06-07 | End: 2022-04-26

## 2021-08-25 ENCOUNTER — APPOINTMENT (OUTPATIENT)
Dept: WOMENS IMAGING | Facility: HOSPITAL | Age: 71
End: 2021-08-25

## 2021-08-25 PROCEDURE — 77067 SCR MAMMO BI INCL CAD: CPT | Performed by: RADIOLOGY

## 2021-08-25 PROCEDURE — 77063 BREAST TOMOSYNTHESIS BI: CPT | Performed by: RADIOLOGY

## 2022-03-15 ENCOUNTER — OFFICE (AMBULATORY)
Dept: URBAN - METROPOLITAN AREA CLINIC 75 | Facility: CLINIC | Age: 72
End: 2022-03-15

## 2022-03-15 VITALS
SYSTOLIC BLOOD PRESSURE: 118 MMHG | OXYGEN SATURATION: 99 % | WEIGHT: 131 LBS | HEART RATE: 75 BPM | DIASTOLIC BLOOD PRESSURE: 76 MMHG | HEIGHT: 61 IN

## 2022-03-15 DIAGNOSIS — K59.00 CONSTIPATION, UNSPECIFIED: ICD-10-CM

## 2022-03-15 DIAGNOSIS — Z85.038 PERSONAL HISTORY OF OTHER MALIGNANT NEOPLASM OF LARGE INTEST: ICD-10-CM

## 2022-03-15 DIAGNOSIS — K21.9 GASTRO-ESOPHAGEAL REFLUX DISEASE WITHOUT ESOPHAGITIS: ICD-10-CM

## 2022-03-15 PROCEDURE — 99214 OFFICE O/P EST MOD 30 MIN: CPT | Performed by: NURSE PRACTITIONER

## 2022-03-15 RX ORDER — LUBIPROSTONE 24 UG/1
24 CAPSULE, GELATIN COATED ORAL
Qty: 90 | Refills: 3 | Status: COMPLETED
Start: 2021-03-12 | End: 2022-05-02

## 2022-04-12 VITALS
HEART RATE: 75 BPM | DIASTOLIC BLOOD PRESSURE: 42 MMHG | DIASTOLIC BLOOD PRESSURE: 45 MMHG | OXYGEN SATURATION: 97 % | HEART RATE: 79 BPM | SYSTOLIC BLOOD PRESSURE: 101 MMHG | HEART RATE: 90 BPM | SYSTOLIC BLOOD PRESSURE: 189 MMHG | HEART RATE: 92 BPM | WEIGHT: 124 LBS | RESPIRATION RATE: 15 BRPM | DIASTOLIC BLOOD PRESSURE: 44 MMHG | SYSTOLIC BLOOD PRESSURE: 90 MMHG | TEMPERATURE: 98.1 F | DIASTOLIC BLOOD PRESSURE: 51 MMHG | RESPIRATION RATE: 9 BRPM | RESPIRATION RATE: 16 BRPM | HEART RATE: 82 BPM | OXYGEN SATURATION: 100 % | HEART RATE: 74 BPM | HEIGHT: 61 IN | TEMPERATURE: 97.9 F | HEART RATE: 81 BPM | SYSTOLIC BLOOD PRESSURE: 116 MMHG | SYSTOLIC BLOOD PRESSURE: 88 MMHG | DIASTOLIC BLOOD PRESSURE: 59 MMHG | HEART RATE: 67 BPM | DIASTOLIC BLOOD PRESSURE: 47 MMHG | SYSTOLIC BLOOD PRESSURE: 83 MMHG | DIASTOLIC BLOOD PRESSURE: 40 MMHG | DIASTOLIC BLOOD PRESSURE: 56 MMHG | SYSTOLIC BLOOD PRESSURE: 91 MMHG | HEART RATE: 66 BPM | DIASTOLIC BLOOD PRESSURE: 89 MMHG | SYSTOLIC BLOOD PRESSURE: 108 MMHG | OXYGEN SATURATION: 99 % | RESPIRATION RATE: 17 BRPM

## 2022-04-15 ENCOUNTER — AMBULATORY SURGICAL CENTER (AMBULATORY)
Dept: URBAN - METROPOLITAN AREA SURGERY 17 | Facility: SURGERY | Age: 72
End: 2022-04-15
Payer: MEDICARE

## 2022-04-15 DIAGNOSIS — Z86.010 PERSONAL HISTORY OF COLONIC POLYPS: ICD-10-CM

## 2022-04-15 PROCEDURE — G0105 COLORECTAL SCRN; HI RISK IND: HCPCS | Performed by: INTERNAL MEDICINE

## 2022-04-26 RX ORDER — METHOCARBAMOL 750 MG/1
TABLET ORAL
Qty: 12 CAPSULE | Refills: 3 | Status: SHIPPED | OUTPATIENT
Start: 2022-04-26

## 2022-05-02 RX ORDER — ACYCLOVIR 400 MG/1
400 TABLET ORAL DAILY
Qty: 90 TABLET | Refills: 0 | Status: SHIPPED | OUTPATIENT
Start: 2022-05-02 | End: 2022-10-17

## 2022-07-28 ENCOUNTER — OFFICE (AMBULATORY)
Dept: URBAN - METROPOLITAN AREA CLINIC 75 | Facility: CLINIC | Age: 72
End: 2022-07-28

## 2022-07-28 VITALS
OXYGEN SATURATION: 95 % | SYSTOLIC BLOOD PRESSURE: 130 MMHG | DIASTOLIC BLOOD PRESSURE: 82 MMHG | HEART RATE: 106 BPM | HEIGHT: 61 IN | WEIGHT: 129.2 LBS

## 2022-07-28 DIAGNOSIS — K21.9 GASTRO-ESOPHAGEAL REFLUX DISEASE WITHOUT ESOPHAGITIS: ICD-10-CM

## 2022-07-28 DIAGNOSIS — K59.00 CONSTIPATION, UNSPECIFIED: ICD-10-CM

## 2022-07-28 DIAGNOSIS — K57.30 DIVERTICULOSIS OF LARGE INTESTINE WITHOUT PERFORATION OR ABS: ICD-10-CM

## 2022-07-28 PROCEDURE — 99214 OFFICE O/P EST MOD 30 MIN: CPT | Performed by: NURSE PRACTITIONER

## 2022-07-28 RX ORDER — PLECANATIDE 3 MG/1
TABLET ORAL
Qty: 90 | Refills: 3 | Status: ACTIVE
Start: 2022-05-02

## 2022-08-01 RX ORDER — ACYCLOVIR 400 MG/1
400 TABLET ORAL DAILY
Qty: 90 TABLET | Refills: 0 | OUTPATIENT
Start: 2022-08-01

## 2022-08-26 ENCOUNTER — APPOINTMENT (OUTPATIENT)
Dept: WOMENS IMAGING | Facility: HOSPITAL | Age: 72
End: 2022-08-26

## 2022-08-26 PROCEDURE — 77063 BREAST TOMOSYNTHESIS BI: CPT | Performed by: RADIOLOGY

## 2022-08-26 PROCEDURE — 77067 SCR MAMMO BI INCL CAD: CPT | Performed by: RADIOLOGY

## 2022-10-17 RX ORDER — ACYCLOVIR 400 MG/1
400 TABLET ORAL DAILY
Qty: 90 TABLET | Refills: 0 | Status: SHIPPED | OUTPATIENT
Start: 2022-10-17 | End: 2023-02-09 | Stop reason: SDUPTHER

## 2023-02-09 ENCOUNTER — TELEPHONE (OUTPATIENT)
Dept: OBSTETRICS AND GYNECOLOGY | Age: 73
End: 2023-02-09
Payer: MEDICARE

## 2023-02-09 RX ORDER — ACYCLOVIR 400 MG/1
400 TABLET ORAL DAILY
Qty: 90 TABLET | Refills: 1 | Status: SHIPPED | OUTPATIENT
Start: 2023-02-09

## 2023-02-09 NOTE — TELEPHONE ENCOUNTER
Pt calls requesting refills of her acyclovir, last AE 03/2021, has [employer] medicare replacement. Please advise if you will refill, pharmacy verified

## 2023-08-07 ENCOUNTER — OFFICE (AMBULATORY)
Dept: URBAN - METROPOLITAN AREA CLINIC 76 | Facility: CLINIC | Age: 73
End: 2023-08-07

## 2023-08-07 VITALS
SYSTOLIC BLOOD PRESSURE: 136 MMHG | HEART RATE: 68 BPM | DIASTOLIC BLOOD PRESSURE: 88 MMHG | OXYGEN SATURATION: 98 % | WEIGHT: 132 LBS | HEIGHT: 61 IN

## 2023-08-07 DIAGNOSIS — K21.9 GASTRO-ESOPHAGEAL REFLUX DISEASE WITHOUT ESOPHAGITIS: ICD-10-CM

## 2023-08-07 DIAGNOSIS — K59.00 CONSTIPATION, UNSPECIFIED: ICD-10-CM

## 2023-08-07 DIAGNOSIS — Z86.010 PERSONAL HISTORY OF COLONIC POLYPS: ICD-10-CM

## 2023-08-07 DIAGNOSIS — K57.30 DIVERTICULOSIS OF LARGE INTESTINE WITHOUT PERFORATION OR ABS: ICD-10-CM

## 2023-08-07 PROCEDURE — 99214 OFFICE O/P EST MOD 30 MIN: CPT | Performed by: NURSE PRACTITIONER

## 2023-08-07 RX ORDER — PLECANATIDE 3 MG/1
TABLET ORAL
Qty: 90 | Refills: 3 | Status: ACTIVE
Start: 2022-05-02

## 2023-08-14 RX ORDER — ACYCLOVIR 400 MG/1
400 TABLET ORAL DAILY
Qty: 90 TABLET | Refills: 1 | Status: SHIPPED | OUTPATIENT
Start: 2023-08-14

## 2023-08-28 ENCOUNTER — APPOINTMENT (OUTPATIENT)
Dept: WOMENS IMAGING | Facility: HOSPITAL | Age: 73
End: 2023-08-28
Payer: MEDICARE

## 2023-08-28 PROCEDURE — 77063 BREAST TOMOSYNTHESIS BI: CPT | Performed by: RADIOLOGY

## 2023-08-28 PROCEDURE — 77067 SCR MAMMO BI INCL CAD: CPT | Performed by: RADIOLOGY

## 2024-02-09 RX ORDER — ACYCLOVIR 400 MG/1
400 TABLET ORAL DAILY
Qty: 90 TABLET | Refills: 0 | Status: SHIPPED | OUTPATIENT
Start: 2024-02-09

## 2024-03-08 ENCOUNTER — OFFICE VISIT (OUTPATIENT)
Dept: OBSTETRICS AND GYNECOLOGY | Age: 74
End: 2024-03-08
Payer: MEDICARE

## 2024-03-08 VITALS
WEIGHT: 142 LBS | DIASTOLIC BLOOD PRESSURE: 74 MMHG | HEIGHT: 62 IN | SYSTOLIC BLOOD PRESSURE: 118 MMHG | BODY MASS INDEX: 26.13 KG/M2

## 2024-03-08 DIAGNOSIS — Z11.51 SPECIAL SCREENING EXAMINATION FOR HUMAN PAPILLOMAVIRUS (HPV): ICD-10-CM

## 2024-03-08 DIAGNOSIS — Z12.4 SCREENING FOR MALIGNANT NEOPLASM OF THE CERVIX: Primary | ICD-10-CM

## 2024-03-08 DIAGNOSIS — Z01.419 ENCOUNTER FOR GYNECOLOGICAL EXAMINATION WITHOUT ABNORMAL FINDING: ICD-10-CM

## 2024-03-08 RX ORDER — LORATADINE 10 MG/1
CAPSULE, LIQUID FILLED ORAL
COMMUNITY

## 2024-03-08 RX ORDER — PLECANATIDE 3 MG/1
TABLET ORAL
COMMUNITY
Start: 2024-02-21

## 2024-03-08 RX ORDER — SENNOSIDES 8.6 MG
CAPSULE ORAL
COMMUNITY

## 2024-03-08 RX ORDER — ROSUVASTATIN CALCIUM 5 MG/1
5 TABLET, COATED ORAL DAILY
COMMUNITY
Start: 2024-03-08 | End: 2025-03-08

## 2024-03-08 NOTE — PROGRESS NOTES
"Subjective     Chief Complaint   Patient presents with    Gynecologic Exam     AC       History of Present Illness    Lenore Angulo is a 74 y.o.  who presents for gynecological exam.  Patient reports that she is having trouble with her sciatic nerve and received an epidural.  She walks for exercise.  She has no vaginal bleeding or pelvic pain.  She desires to do a Pap.  She has no history of abnormal Pap smear.      Obstetric History:  OB History          2    Para   2    Term   2            AB        Living   2         SAB        IAB        Ectopic        Molar        Multiple        Live Births   2               Menstrual History:     No LMP recorded (lmp unknown). Patient is postmenopausal.         Current contraception: post menopausal status  History of abnormal Pap smear: no  Received Gardasil immunization: no  Perform regular self breast exam : yes  Family history of uterine or ovarian cancer: no  Family History of colon cancer: no  Family history of breast cancer: yes - Mom 70 and sister 70     Mammogram: up to date.  Colonoscopy: up to date.  DEXA: up to date.  Endo follow on prolia for OP     Exercise  walking daily   Calcium/Vitamin D: adequate intake and uses supplements    The following portions of the patient's history were reviewed and updated as appropriate: allergies, current medications, past family history, past medical history, past social history, past surgical history, and problem list.    Review of Systems        Objective   Physical Exam    /74   Ht 156.2 cm (61.5\")   Wt 64.4 kg (142 lb)   LMP  (LMP Unknown)   BMI 26.40 kg/m²     General:   alert, appears stated age and cooperative   Neck:    Heart: regular rate and rhythm   Lungs: clear to auscultation bilaterally   Abdomen: soft, non-tender, without masses or organomegaly   Breast: inspection negative, no nipple discharge or bleeding, no masses or nodularity palpable   Vulva: normal, Bartholin's, Urethra, " Michelle's normal   Vagina: normal mucosa, normal discharge   Cervix: no cervical motion tenderness and no lesions   Uterus: non-tender, normal shape and consistency   Adnexa: no mass, fullness, tenderness   Rectal: not indicated     Assessment & Plan   Diagnoses and all orders for this visit:    1. Screening for malignant neoplasm of the cervix (Primary)  -     IGP, Aptima HPV, Rfx 16 / 18,45    2. Special screening examination for human papillomavirus (HPV)  -     IGP, Aptima HPV, Rfx 16 / 18,45    3. Encounter for gynecological examination without abnormal finding      Discussed that the patient could discontinue doing Pap smears.  Patient would like to continue.  Discussed with the patient to call if she has any vaginal bleeding or pelvic pain.  All questions answered.  Breast self exam technique reviewed and patient encouraged to perform self-exam monthly.  Discussed healthy lifestyle modifications.  Recommended 30 minutes of aerobic exercise five times per week.

## 2024-03-12 LAB
CYTOLOGIST CVX/VAG CYTO: NORMAL
CYTOLOGY CVX/VAG DOC CYTO: NORMAL
CYTOLOGY CVX/VAG DOC THIN PREP: NORMAL
DX ICD CODE: NORMAL
HPV GENOTYPE REFLEX: NORMAL
HPV I/H RISK 4 DNA CVX QL PROBE+SIG AMP: NEGATIVE
Lab: NORMAL
OTHER STN SPEC: NORMAL
STAT OF ADQ CVX/VAG CYTO-IMP: NORMAL

## 2024-08-29 ENCOUNTER — APPOINTMENT (OUTPATIENT)
Dept: WOMENS IMAGING | Facility: HOSPITAL | Age: 74
End: 2024-08-29
Payer: MEDICARE

## 2024-08-29 PROCEDURE — 77063 BREAST TOMOSYNTHESIS BI: CPT | Performed by: RADIOLOGY

## 2024-08-29 PROCEDURE — 77067 SCR MAMMO BI INCL CAD: CPT | Performed by: RADIOLOGY

## 2025-01-16 RX ORDER — ACYCLOVIR 400 MG/1
400 TABLET ORAL DAILY
Qty: 90 TABLET | Refills: 0 | Status: SHIPPED | OUTPATIENT
Start: 2025-01-16

## 2025-03-27 ENCOUNTER — OFFICE VISIT (OUTPATIENT)
Dept: OBSTETRICS AND GYNECOLOGY | Age: 75
End: 2025-03-27
Payer: MEDICARE

## 2025-03-27 VITALS
DIASTOLIC BLOOD PRESSURE: 80 MMHG | HEIGHT: 62 IN | SYSTOLIC BLOOD PRESSURE: 124 MMHG | WEIGHT: 140 LBS | BODY MASS INDEX: 25.76 KG/M2

## 2025-03-27 DIAGNOSIS — N64.4 BREAST PAIN, RIGHT: Primary | ICD-10-CM

## 2025-03-27 DIAGNOSIS — Z13.71 BRCA NEGATIVE: ICD-10-CM

## 2025-03-27 NOTE — PROGRESS NOTES
"Subjective     Chief Complaint   Patient presents with    Breast Problem     C/o right breast pain, under arm.  She first noticed in feb.  She said it first happened when she was using weights.  So she stopped but is still noticing.        Lneore Angulo is a 75 y.o.  whose LMP is No LMP recorded (lmp unknown). Patient is postmenopausal. presents with right breast pain    She started with right breast/axillary pain in February  Thought it was r/t lifting weights so she stopped   The pain did not resolve  Then progressed to pain in right armpit to right breast  No palpable masses    Has noted an increase in her breast size as well, B  She notes no skin changes or nipple d/c  Had mammogram in September that was normal    Drinks green tea.    No Additional Complaints Reported    The following portions of the patient's history were reviewed and updated as appropriate:no additional history reviewed, vital signs, allergies, current medications, past medical history, past social history, past surgical history, and problem list      Review of Systems   Integument/breast: positive for breast tenderness     Objective      /80   Ht 156.2 cm (61.5\")   Wt 63.5 kg (140 lb)   LMP  (LMP Unknown)   BMI 26.02 kg/m²     Physical Exam    General:   alert and comfortable   Heart: Not performed today   Lungs: Not performed today.   Breast: Inspection negative, No nipple retraction or dimpling, No nipple discharge or bleeding, No axillary or supraclavicular adenopathy, Normal to palpation without dominant masses, positive findings: right breast tenderness at 11 o'clock. No masses noted. No nipple d/c    Neck: Not performed today   Abdomen: Not performed today   CVA: Not performed today   Pelvis: Not performed today   Extremities: Not performed today   Neurologic: negative   Psychiatric: Normal affect, judgement, and mood       Lab Review   Labs: No data reviewed    Imaging   No data reviewed    Assessment & Plan "     ASSESSMENT  1. Breast pain, right    2. BRCA negative          PLAN  1.   Orders Placed This Encounter   Procedures    US Breast Right Limited    Mammo Diagnostic Right With CAD       2. Breast imaging ordered. No obvious masses noted today. If imaging is wnl, can consider f/u with breast specialist. She will let me know    Follow up: KOBE Reid  3/27/2025

## 2025-04-02 ENCOUNTER — APPOINTMENT (OUTPATIENT)
Dept: WOMENS IMAGING | Facility: HOSPITAL | Age: 75
End: 2025-04-02
Payer: MEDICARE

## 2025-04-02 DIAGNOSIS — N64.4 BREAST PAIN, RIGHT: Primary | ICD-10-CM

## 2025-04-02 PROCEDURE — 77065 DX MAMMO INCL CAD UNI: CPT | Performed by: RADIOLOGY

## 2025-04-02 PROCEDURE — 76642 ULTRASOUND BREAST LIMITED: CPT | Performed by: RADIOLOGY

## 2025-04-02 PROCEDURE — G0279 TOMOSYNTHESIS, MAMMO: HCPCS | Performed by: RADIOLOGY

## 2025-04-07 DIAGNOSIS — Z12.31 SCREENING MAMMOGRAM FOR BREAST CANCER: Primary | ICD-10-CM

## 2025-05-13 RX ORDER — ACYCLOVIR 400 MG/1
400 TABLET ORAL DAILY
Qty: 90 TABLET | Refills: 0 | Status: SHIPPED | OUTPATIENT
Start: 2025-05-13